# Patient Record
Sex: MALE | Race: ASIAN | NOT HISPANIC OR LATINO | URBAN - METROPOLITAN AREA
[De-identification: names, ages, dates, MRNs, and addresses within clinical notes are randomized per-mention and may not be internally consistent; named-entity substitution may affect disease eponyms.]

---

## 2019-08-21 ENCOUNTER — EMERGENCY (EMERGENCY)
Facility: HOSPITAL | Age: 19
LOS: 1 days | Discharge: HOME | End: 2019-08-21
Attending: STUDENT IN AN ORGANIZED HEALTH CARE EDUCATION/TRAINING PROGRAM
Payer: SELF-PAY

## 2019-08-21 VITALS
SYSTOLIC BLOOD PRESSURE: 125 MMHG | OXYGEN SATURATION: 100 % | TEMPERATURE: 98 F | RESPIRATION RATE: 16 BRPM | WEIGHT: 177.91 LBS | DIASTOLIC BLOOD PRESSURE: 80 MMHG | HEART RATE: 78 BPM | HEIGHT: 74 IN

## 2019-08-21 VITALS
SYSTOLIC BLOOD PRESSURE: 128 MMHG | OXYGEN SATURATION: 98 % | DIASTOLIC BLOOD PRESSURE: 90 MMHG | RESPIRATION RATE: 18 BRPM | HEART RATE: 80 BPM

## 2019-08-21 PROCEDURE — 99283 EMERGENCY DEPT VISIT LOW MDM: CPT

## 2019-08-21 RX ORDER — IBUPROFEN 200 MG
600 TABLET ORAL ONCE
Refills: 0 | Status: COMPLETED | OUTPATIENT
Start: 2019-08-21 | End: 2019-08-21

## 2019-08-21 RX ORDER — ACETAMINOPHEN 500 MG
650 TABLET ORAL ONCE
Refills: 0 | Status: COMPLETED | OUTPATIENT
Start: 2019-08-21 | End: 2019-08-21

## 2019-08-21 RX ORDER — DIPHENHYDRAMINE HCL 50 MG
25 CAPSULE ORAL ONCE
Refills: 0 | Status: COMPLETED | OUTPATIENT
Start: 2019-08-21 | End: 2019-08-21

## 2019-08-21 RX ADMIN — Medication 25 MILLIGRAM(S): at 14:49

## 2019-08-21 RX ADMIN — Medication 650 MILLIGRAM(S): at 14:03

## 2019-08-21 RX ADMIN — Medication 650 MILLIGRAM(S): at 14:33

## 2019-08-21 RX ADMIN — Medication 600 MILLIGRAM(S): at 14:33

## 2019-08-21 RX ADMIN — Medication 600 MILLIGRAM(S): at 14:03

## 2019-08-21 NOTE — ED ADULT NURSE REASSESSMENT NOTE - NS ED NURSE REASSESS COMMENT FT1
Benadryl given.  As per Sanchez BETHEA, Pt cleared for discharge.  Pt states he feels better with Motrin and Tylenol.  Discharge instructions given, Pt to follow up with PCP in 1-3 days.  Pt discharged with steady gait to waiting room. VSS

## 2019-08-21 NOTE — ED PROVIDER NOTE - OBJECTIVE STATEMENT
Ethan Pappas documenting for Pati Kemp): 18 year old male with no significant pmhx or pshx presents to the ED c/o bilateral ear pain s/p bee stings to both ears x30 minutes. Pt claims he was stung by 4/5 bees on the pinna and that there is persistent pain to the right ear, although pain in the left ear has subsided. Denies SOB, itching, abdominal pain, vomiting, nausea, wheezing, swelling, difficulty hearing. Has not taken any medications for pain. No history of allergy to bee stings, NKDA. Ethan Pappas documenting for Pati Kemp): 18 year old male with no significant pmhx or pshx presents to the ED c/o bilateral ear pain s/p bee stings to both ears x30 minutes. Pt states he opened a trash can and there were about 20 bees inside, pt states he stung by 4/5 bees on the pinna and that there is persistent pain to the right ear, although pain in the left ear has subsided. Denies SOB, itching, abdominal pain, vomiting, nausea, wheezing, swelling, difficulty hearing. Has not taken any medications for pain. No history of allergy to bee stings, NKDA.

## 2019-08-21 NOTE — ED PROVIDER NOTE - NSFOLLOWUPCLINICS_GEN_ALL_ED_FT
[facility] General Internal Medicine  General Internal Medicine  [address] [id number]  Phone: [phone]  Fax:   Follow Up Time: 1-3 Days

## 2019-08-21 NOTE — ED PROVIDER NOTE - PHYSICAL EXAMINATION
HENT: Erythema bilaterally to the external pinnae. No erythema or redness in the external auditory canal, unable to visualize TMs bilaterally. No obvious openings or drainage from bilateral ears.  Lungs: CTAB, no wheezing.   Heart: Normal rate and rhythm.   General: Awake, alert, and talking. AAOx4.

## 2019-08-21 NOTE — ED PROVIDER NOTE - CLINICAL SUMMARY MEDICAL DECISION MAKING FREE TEXT BOX
18 year old male with no pmhx coming in secondary to pain from bee stings. Non-toxic well-appearing male with mild erythema to the bilateral ears. No signs or symptoms for anaphylaxis or infection at this time, will symptomatically control pain with Tylenol and Motrin then reassess. Likely DC home with strict return precautions.

## 2019-08-21 NOTE — ED PROVIDER NOTE - ATTENDING CONTRIBUTION TO CARE
bee sting to bilateral ears w/ L>R erythema, no intraear involvement no intraoral involvement, no signs of anaphylaxis, - no cp, shortness of breath, vomiting/ abdominal pain. plan for symptomatic treatment, benadryl, tylenol/motrin bee sting to bilateral ears w/ L>R erythema, no inner ear involvement no intraoral involvement, no signs of anaphylaxis, - no cp, shortness of breath, vomiting/ abdominal pain. no stridor, no dyspnea. no wheeze on lung exam plan for symptomatic treatment, benadryl, tylenol/motrin

## 2019-08-21 NOTE — ED ADULT NURSE NOTE - OBJECTIVE STATEMENT
18y Male A&Ox3 c/o bee sting to both ears. No PMH or PSH.  Pt states he was stung by same bee twice, one time to each ear helix 1315, Pt states this is his first bee sting.  Pt presents today with redness to both ear, no swelling, difficulty breathing, SOB, hives, rash. Sanchez CONNER MD evaluated Pt, Pt to get pain medication and icepack to ears and discharged.  +2 pulses in all extremities with full ROM and equal strength, lung sounds clear bilaterally with adequate and equal chest rise. 18y Male A&Ox3 c/o bee sting to both ears. No PMH or PSH.  Pt states he was stung by same bee twice, one time to each ear helix 1315, Pt states this is his first bee sting.  Pt presents today with redness to both ear. No swelling noted. Denies difficulty breathing, SOB, hives, rash. Sanchez CONNER MD evaluated Pt, Pt to get pain medication and icepack to ears and discharged.  +2 pulses in all extremities with full ROM and equal strength, lung sounds clear bilaterally with adequate and equal chest rise.

## 2019-08-21 NOTE — ED PROVIDER NOTE - NSFOLLOWUPINSTRUCTIONS_ED_ALL_ED_FT
How are insect bites and stings different?  When an insect bites you, it uses its mouth parts. When an insect stings you, it uses a special "stinger" on the back of its body.    Biting insects can transfer blood from other people and animals they've bitten to you. That means they can infect you with the diseases their other victims have. Mosquitoes and ticks, for example, can carry a few infections.    Stinging insects, such as bees, wasps, and fire ants, do not usually carry disease. But stinging insects can inject you with venom that can irritate your skin. Plus, insect stings can be deadly to people who are severely allergic to the insect venom.      What should I do if I am stung by a bee, wasp, or fire ant?  If you are stung by a bee or wasp, quickly remove the stinger from your skin if it is still there. If you are stung by a fire ant, kill the ant with a slap as soon as you feel the sting.    Some people have a severe allergic reaction to insect stings called anaphylaxis. Call for an ambulance (in the [place] and [place], dial [id number]) if you suddenly:    ?Have trouble breathing, become hoarse, or start wheezing (hearing a whistling sound when you breathe)    ?Start to swell, especially around the face, eyelids, ears, mouth, hands, or feet    ?Develop belly cramps, nausea, vomiting, or diarrhea    ?Feel dizzy or pass out        What is a normal reaction to an insect sting?  Insect stings can cause the area around the sting to swell, turn red, hurt, and feel hot (picture 1).    To treat the pain and swelling around the area of the sting, you can:    ?Wash the area with soap and cool water    ?Keep the area clean and try not to scratch it    ?Put a cold, damp washcloth on the area    ?Take or apply anti-itch medicine    ?Take a nonprescription pain medicine for the pain        What should I know about tick bites?  Ticks are found in the grass and on shrubs, and can attach to people walking by. One type of tick can spread Lyme disease. But a tick has to stay attached for a while before it can give you the infection. If you are bitten by a tick, gently remove the tick from your skin, using tweezers. You can save the tick by sealing it in a piece of clear tape. If you can't save it, try to remember its color and size. This can help your doctor or nurse figure out if it might be the type of tick that carries Lyme disease.    Call your doctor or nurse if:    ?You cannot remove a tick from yourself or your child       ?You get a fever or rash within the next few weeks      ?You think you have had a tick attached for at least 36 hours (a day and a half)      Your doctor or nurse can then decide if you need to take a dose of an antibiotic to help prevent Lyme. Doctors only recommend antibiotics to prevent Lyme disease in some situations. It depends on your age, where you live, what kind of tick bit you, and how long it was attached.      What can I do to reduce the chances of getting bitten or stung?  You can:    ?Wear shoes, long-sleeved shirts, and long pants when you go outside. If you are worried about ticks, tuck your pants into your socks and wear light colors so you can spot any ticks that get on you.      ?Wear bug spray.      ?Stay inside at sidra and dusk, when mosquitoes are most active.      ?Drain areas of standing water near your home, such as wading pools and buckets. Mosquitoes breed in standing water.      ?Keep foods and drinks covered when you are outside.      ?If you see a stinging insect, stay calm and slowly back away.      ?If you live in an area that has fire ants, avoid stepping on ant mounds.      ?If you find an insect nest in or near your house, call a pest-control service to get rid of the nest safely. Take tylenol/motrin as needed for ear pain. Please take benadryl for mild swelling to the ears.   Return to the ED if you develop throat swelling, trouble breathing, vomiting, lightheadedness, dizziness, chest pain, shortness of breath or any other concerning symptoms.   Please follow up with your doctor over the next 2-3 days for your symptoms.     How are insect bites and stings different?  When an insect bites you, it uses its mouth parts. When an insect stings you, it uses a special "stinger" on the back of its body.    Biting insects can transfer blood from other people and animals they've bitten to you. That means they can infect you with the diseases their other victims have. Mosquitoes and ticks, for example, can carry a few infections.    Stinging insects, such as bees, wasps, and fire ants, do not usually carry disease. But stinging insects can inject you with venom that can irritate your skin. Plus, insect stings can be deadly to people who are severely allergic to the insect venom.      What should I do if I am stung by a bee, wasp, or fire ant?  If you are stung by a bee or wasp, quickly remove the stinger from your skin if it is still there. If you are stung by a fire ant, kill the ant with a slap as soon as you feel the sting.    Some people have a severe allergic reaction to insect stings called anaphylaxis. Call for an ambulance (in the [place] and [place], dial 9-1-1) if you suddenly:    ?Have trouble breathing, become hoarse, or start wheezing (hearing a whistling sound when you breathe)    ?Start to swell, especially around the face, eyelids, ears, mouth, hands, or feet    ?Develop belly cramps, nausea, vomiting, or diarrhea    ?Feel dizzy or pass out        What is a normal reaction to an insect sting?  Insect stings can cause the area around the sting to swell, turn red, hurt, and feel hot (picture 1).    To treat the pain and swelling around the area of the sting, you can:    ?Wash the area with soap and cool water    ?Keep the area clean and try not to scratch it    ?Put a cold, damp washcloth on the area    ?Take or apply anti-itch medicine    ?Take a nonprescription pain medicine for the pain        What should I know about tick bites?  Ticks are found in the grass and on shrubs, and can attach to people walking by. One type of tick can spread Lyme disease. But a tick has to stay attached for a while before it can give you the infection. If you are bitten by a tick, gently remove the tick from your skin, using tweezers. You can save the tick by sealing it in a piece of clear tape. If you can't save it, try to remember its color and size. This can help your doctor or nurse figure out if it might be the type of tick that carries Lyme disease.    Call your doctor or nurse if:    ?You cannot remove a tick from yourself or your child       ?You get a fever or rash within the next few weeks      ?You think you have had a tick attached for at least 36 hours (a day and a half)      Your doctor or nurse can then decide if you need to take a dose of an antibiotic to help prevent Lyme. Doctors only recommend antibiotics to prevent Lyme disease in some situations. It depends on your age, where you live, what kind of tick bit you, and how long it was attached.      What can I do to reduce the chances of getting bitten or stung?  You can:    ?Wear shoes, long-sleeved shirts, and long pants when you go outside. If you are worried about ticks, tuck your pants into your socks and wear light colors so you can spot any ticks that get on you.      ?Wear bug spray.      ?Stay inside at sidra and dusk, when mosquitoes are most active.      ?Drain areas of standing water near your home, such as wading pools and buckets. Mosquitoes breed in standing water.      ?Keep foods and drinks covered when you are outside.      ?If you see a stinging insect, stay calm and slowly back away.      ?If you live in an area that has fire ants, avoid stepping on ant mounds.      ?If you find an insect nest in or near your house, call a pest-control service to get rid of the nest safely.